# Patient Record
Sex: MALE | Race: WHITE | NOT HISPANIC OR LATINO
[De-identification: names, ages, dates, MRNs, and addresses within clinical notes are randomized per-mention and may not be internally consistent; named-entity substitution may affect disease eponyms.]

---

## 2018-12-05 ENCOUNTER — RESULT REVIEW (OUTPATIENT)
Age: 63
End: 2018-12-05

## 2022-05-26 ENCOUNTER — APPOINTMENT (OUTPATIENT)
Dept: ORTHOPEDIC SURGERY | Facility: CLINIC | Age: 67
End: 2022-05-26

## 2022-07-25 PROBLEM — Z00.00 ENCOUNTER FOR PREVENTIVE HEALTH EXAMINATION: Status: ACTIVE | Noted: 2022-07-25

## 2022-08-03 ENCOUNTER — NON-APPOINTMENT (OUTPATIENT)
Age: 67
End: 2022-08-03

## 2022-08-04 ENCOUNTER — APPOINTMENT (OUTPATIENT)
Dept: ORTHOPEDIC SURGERY | Facility: CLINIC | Age: 67
End: 2022-08-04

## 2022-08-04 ENCOUNTER — NON-APPOINTMENT (OUTPATIENT)
Age: 67
End: 2022-08-04

## 2022-08-04 VITALS
WEIGHT: 194 LBS | BODY MASS INDEX: 28.73 KG/M2 | HEIGHT: 69 IN | HEART RATE: 52 BPM | DIASTOLIC BLOOD PRESSURE: 88 MMHG | SYSTOLIC BLOOD PRESSURE: 166 MMHG

## 2022-08-04 DIAGNOSIS — M67.432 GANGLION, LEFT WRIST: ICD-10-CM

## 2022-08-04 DIAGNOSIS — M18.11 UNILATERAL PRIMARY OSTEOARTHRITIS OF FIRST CARPOMETACARPAL JOINT, RIGHT HAND: ICD-10-CM

## 2022-08-04 DIAGNOSIS — M18.12 UNILATERAL PRIMARY OSTEOARTHRITIS OF FIRST CARPOMETACARPAL JOINT, LEFT HAND: ICD-10-CM

## 2022-08-04 PROCEDURE — 73110 X-RAY EXAM OF WRIST: CPT | Mod: LT

## 2022-08-04 PROCEDURE — 20612 ASPIRATE/INJ GANGLION CYST: CPT | Mod: LT

## 2022-08-04 PROCEDURE — 99203 OFFICE O/P NEW LOW 30 MIN: CPT | Mod: 25

## 2022-08-04 RX ORDER — MELOXICAM 15 MG/1
15 TABLET ORAL
Qty: 30 | Refills: 0 | Status: ACTIVE | COMMUNITY
Start: 2022-03-28

## 2022-08-04 RX ORDER — DICLOFENAC SODIUM 1% 10 MG/G
1 GEL TOPICAL
Qty: 1 | Refills: 3 | Status: ACTIVE | COMMUNITY
Start: 2022-08-04 | End: 1900-01-01

## 2022-08-04 RX ORDER — METOPROLOL SUCCINATE 50 MG/1
50 TABLET, EXTENDED RELEASE ORAL
Qty: 90 | Refills: 0 | Status: ACTIVE | COMMUNITY
Start: 2022-07-17

## 2022-08-04 RX ORDER — OMEPRAZOLE 20 MG/1
20 CAPSULE, DELAYED RELEASE ORAL
Qty: 90 | Refills: 0 | Status: ACTIVE | COMMUNITY
Start: 2022-06-14

## 2022-08-04 RX ORDER — ATORVASTATIN CALCIUM 20 MG/1
20 TABLET, FILM COATED ORAL
Qty: 90 | Refills: 0 | Status: ACTIVE | COMMUNITY
Start: 2022-05-23

## 2022-08-04 RX ORDER — METHYLPREDNISOLONE 4 MG/1
4 TABLET ORAL
Qty: 21 | Refills: 0 | Status: COMPLETED | COMMUNITY
Start: 2022-02-21

## 2022-08-04 RX ORDER — TADALAFIL 10 MG/1
10 TABLET, FILM COATED ORAL
Qty: 6 | Refills: 0 | Status: ACTIVE | COMMUNITY
Start: 2021-05-24

## 2022-08-04 RX ORDER — AMOXICILLIN AND CLAVULANATE POTASSIUM 875; 125 MG/1; MG/1
875-125 TABLET, COATED ORAL
Qty: 14 | Refills: 0 | Status: COMPLETED | COMMUNITY
Start: 2022-02-28

## 2022-08-05 NOTE — PHYSICAL EXAM
[de-identified] : Left wrist\par 15 mm rounded mass overlying radius scapholunate interval nontender more prominent w wrist flexion\par Minimal to no discomfort at maximum wrist flexion.\par No pain wrist extension.\par E/F 45/45.\par No other notable wrist joint findings.\par \par Left thumb basal joint\par Slight swelling and prominence\par Trace crepitus\par Slight pain with manipulation and joint line palpation\par Pain recreates symptoms\par \par Left hand\par No A1 pulley tenderness and no triggering in any finger.\par Laceration scar left long finger fully healed.\par No notable deficit in this area.\par Good finger composite range of motion\par No pertinent MP, PIP, or DIP joint contributory findings, except some Heberden's nodes; none are clinically painful.\par \par Right wrist\par Basal joint manipulation: Slight crepitus no pain\par Right hand:\par No A1 pulley tenderness and no triggering in any finger.\par No pertinent MP, PIP, or DIP joint contributory findings, except some Heberden's nodes; none are clinically painful.\par \par Neurologic: Median, ulnar, and radial motor and sensory are intact. \par Skin: Dark lesion dorsal radial aspect left hand overlying second metacarpal diaphysis \par According to patient has not changed in appearance or size since childhood: 5 mm x 4 mm\par no cyanosis, clubbing, or rashes.\par Vascular: Radial pulses intact.\par Lymphatic: No streaking or epitrochlear adenopathy.\par The patient is awake, alert, and oriented. Affect appropriate. Cooperative.  [de-identified] : Radiographs ordered and interpreted by me today, reviewed and discussed with the patient today.\par \par 5 views left wrist and basal joint\par Narrowing trapezio first metacarpal space.\par Small subchondral cyst formation proximal first metacarpal and distal trapezium.\par Small osteophyte formation.\par Radiographically stage II basal joint arthritis.\par Radiocarpal joint space well-maintained.  Midcarpal joint space including STT maintained.\par No notable MP or PIP degenerative changes.\par Minimal narrowing DIP 3 ulnarly, DIP 5 radially.\par No notable of bony changes in the long finger that would be related to the previous laceration.\par No fractures or dislocations.

## 2022-08-05 NOTE — PROCEDURE
[FreeTextEntry1] : Diagnosis: Dorsal ganglion left wrist.\par Indication: Diagnostic aspiration\par After discussion of treatment options including risks, complications, expectation, alternatives, and after patient verbal consent, following verbal timeout site verification, aspiration of dorsal ganglion left wrist was performed. . The area was prepped sterilely, and with sterile technique, aspiration was performed with 20-gauge needle. As a result of the aspiration and manual compression this is clear typical ganglion fluid was expressed through skin.  Ganglion was flat at the conclusion of this procedure.. The patient tolerated the procedure well without complication. Sterile dressing applied.\par The following instructions were given to the patient: The patient should be cautious in activities for two weeks and then increase to full activities.  Thereafter, the patient should return if symptoms continue, or if they shea and recur subsequently. If symptoms do not recur, the patient need not return and can be seen on an as needed basis. The patient has expressed understanding and acceptance of analysis, treatment, and recommendations.  All questions answered.

## 2022-08-05 NOTE — HISTORY OF PRESENT ILLNESS
[FreeTextEntry1] : Patient is 67 yo RHD male who presents for hand evaluation.\par Pt employed by [employer] as a [employer].\par Pt was lifting heavy money cart and lacerated left long finger 2018.\par The wound is healed and the patient has minimal to no residual issues regarding the left long finger.\par \par Today the patient presents for pain over the radial aspect of the left wrist.  \par Patient questions whether this is related to the left long finger laceration.\par If so, the left wrist pain would be causally related.\par However, the left long finger laceration is unrelated in any way to the left wrist pain and therefore it is NOT WORK RELATED.\par \par Patient provides history that he has had pain over the radial aspect of the left wrist and primarily in the region of left thumb basal joint intermittently for approximately 2 to 3 years.\par Patient does not recall a specific injury to cause the pain to begin.  \par Patient states that it appeared gradually and has increased and intensity and frequency.\par Pain is not especially constant.  Rather pain varies in intensity\par Pain has pain associated with certain movements of the thumb.\par Patient has not tried any medication, and patient has not seen a physician for this problem.\par \par Patient also has rounded swelling over the dorsum of the left wrist nontender present as long as 1to 2 years.\par There is no pain associated.  The patient has not had any evaluation of the mass.\par Patient has no other notable left wrist or hand complaints.\par Patient has no notable pain associated with the left long finger or symptomatology.\par Patient has no pain in the left wrist specifically.\par No pain at first compartment.\par On the left patient denies numbness tingling or triggering.\par \par Patient reports minimal if any right basal joint pain.\par Patient denies triggering.\par No other wrist complaints\par \par

## 2022-08-05 NOTE — DISCUSSION/SUMMARY
[FreeTextEntry1] : Patient has stage II basal joint arthritis left thumb patient.  Patient has not sought any consultation and not tried any treatment prior to today.  Patient states that he was hoping to have a cortisone injection into the basal joint with the explanation that he had an injection in the shoulder and that the pain went away.  I explained to patient that basal joint pain is caused by osteoarthritis and that a cortisone injection is unlikely to permanently eradicate pain because the arthritis is likely to persist.  I recommend that the patient be treated with increasing levels of intervention and to avoid invasive treatment, cortisone injection, unless no other treatment works.  I am not in favor of a cortisone injection as first-line treatment when the patient has had no other forms of treatment and when findings are relatively mild.  Certainly, in the future a cortisone injection could be done if the patient is refractory to less invasive measures.  I recommend topical diclofenac gel because the patient is having a total hip replacement soon and its wisest not to prescribe an anti-inflammatory medication that may have an anticoagulation effect.  If basal joint symptoms continue patient should return in can be started on an oral anti-inflammatory drug. HMTS discussed with patient.  Patient states that he believes that he would not wear such a device with any regularity.  \par \par Dorsal wrist mass is a dorsal ganglion.  Diagnosis was confirmed when the ganglion was perforated and fluid expressed.  I explained to patient that the ganglion is likely to refill and may reappear as quickly as in the next few days.  Unless the ganglion is symptomatic, I recommend no intervention.\par \par Patient inquired whether the basal joint arthritis pain is related to the traumatic injury, long finger laceration, that he sustained at work.  I explained to the patient that they are completely independent and unrelated.  Consequently today's office visit and the basal joint arthritis should NOT be considered a Worker's Compensation case or Worker's Compensation visit.